# Patient Record
Sex: FEMALE | Race: BLACK OR AFRICAN AMERICAN
[De-identification: names, ages, dates, MRNs, and addresses within clinical notes are randomized per-mention and may not be internally consistent; named-entity substitution may affect disease eponyms.]

---

## 2016-02-07 VITALS
SYSTOLIC BLOOD PRESSURE: 151 MMHG | DIASTOLIC BLOOD PRESSURE: 71 MMHG | DIASTOLIC BLOOD PRESSURE: 71 MMHG | SYSTOLIC BLOOD PRESSURE: 151 MMHG | SYSTOLIC BLOOD PRESSURE: 151 MMHG | DIASTOLIC BLOOD PRESSURE: 71 MMHG | SYSTOLIC BLOOD PRESSURE: 151 MMHG | DIASTOLIC BLOOD PRESSURE: 71 MMHG | DIASTOLIC BLOOD PRESSURE: 71 MMHG | SYSTOLIC BLOOD PRESSURE: 151 MMHG

## 2018-02-16 ENCOUNTER — HOSPITAL ENCOUNTER (OUTPATIENT)
Dept: HOSPITAL 61 - MAMMO | Age: 68
Discharge: HOME | End: 2018-02-16
Attending: INTERNAL MEDICINE
Payer: COMMERCIAL

## 2018-02-16 DIAGNOSIS — Z12.31: Primary | ICD-10-CM

## 2018-02-16 PROCEDURE — 77063 BREAST TOMOSYNTHESIS BI: CPT

## 2018-02-16 PROCEDURE — 77067 SCR MAMMO BI INCL CAD: CPT

## 2018-02-27 ENCOUNTER — HOSPITAL ENCOUNTER (OUTPATIENT)
Dept: HOSPITAL 61 - MAMMO | Age: 68
Discharge: HOME | End: 2018-02-27
Attending: INTERNAL MEDICINE
Payer: COMMERCIAL

## 2018-02-27 DIAGNOSIS — N63.10: Primary | ICD-10-CM

## 2018-02-27 PROCEDURE — 76641 ULTRASOUND BREAST COMPLETE: CPT

## 2018-02-27 PROCEDURE — 77065 DX MAMMO INCL CAD UNI: CPT

## 2018-08-24 ENCOUNTER — HOSPITAL ENCOUNTER (OUTPATIENT)
Dept: HOSPITAL 61 - US | Age: 68
Discharge: HOME | End: 2018-08-24
Attending: INTERNAL MEDICINE
Payer: COMMERCIAL

## 2018-08-24 DIAGNOSIS — R92.8: Primary | ICD-10-CM

## 2018-08-24 PROCEDURE — 76641 ULTRASOUND BREAST COMPLETE: CPT

## 2018-08-24 NOTE — RAD
Right breast ultrasound, 8/24/2018:



History: Follow-up nodule



Comparison is made to a study from 2/27/2018.  The area of concern identified

on the previous study at the 6:00 retroareolar level was targeted.  Again

noted is an elongated hypoechoic nodule measuring 6 x 6 x 3 mm.  It is of

similar size and configuration compared to the previous study.  Its margins

are smooth and it is wider than tall.  It has a benign appearance.



IMPRESSION: Stable small right breast nodule.  Sonographic follow-up in 6

months at the time of the patient's bilateral mammography is suggested.





BI-RADS 3-probably benign findings

## 2020-09-15 ENCOUNTER — HOSPITAL ENCOUNTER (OUTPATIENT)
Dept: HOSPITAL 61 - MAMMO | Age: 70
Discharge: HOME | End: 2020-09-15
Attending: INTERNAL MEDICINE
Payer: COMMERCIAL

## 2020-09-15 DIAGNOSIS — Z12.31: Primary | ICD-10-CM

## 2020-09-15 PROCEDURE — 77063 BREAST TOMOSYNTHESIS BI: CPT

## 2020-09-15 PROCEDURE — 77067 SCR MAMMO BI INCL CAD: CPT

## 2020-09-16 NOTE — RAD
DATE: 9/15/2020 8:41 AM



EXAM: MAMMO NICOLAS SCREENING BILATERAL



HISTORY:  Screening



COMPARISON: 2/16/2018



Bilateral CC and MLO views of the breasts were performed. Bilateral breast

tomosynthesis was performed in CC and MLO projections.



This study was interpreted with the benefit of Computerized Aided Detection

(CAD).





FINDINGS:



Breast Density: SCATTERED  The breast parenchyma shows scattered

fibroglandular densities. Breast parenchyma level B





No suspicious masses, microcalcifications or architectural distortion is

present to suggest malignancy in either breast.





The visualized axillae are unremarkable. 



IMPRESSION: No mammographic evidence of malignancy. 



BI-RADS CATEGORY: 1 NEGATIVE



RECOMMENDED FOLLOW-UP: 12M 12 MONTH FOLLOW-UP Annual screening mammography is

recommended, unless clinically indicated sooner based on symptoms or change in

physical exam.



PQRS compliance statement: Patient information was entered into a reminder

system with a target due date for the next mammogram.



Mammography is a sensitive method for finding small breast cancers, but it

does not detect them all and is not a substitute for careful clinical

examination.  A negative mammogram does not negate a clinically suspicious

finding and should not result in delay in biopsying a clinically suspicious

abnormality.



"Our facility is accredited by the [employer] Mammography

Program."

## 2021-09-21 ENCOUNTER — HOSPITAL ENCOUNTER (OUTPATIENT)
Dept: HOSPITAL 61 - MAMMO | Age: 71
End: 2021-09-21
Attending: INTERNAL MEDICINE
Payer: COMMERCIAL

## 2021-09-21 DIAGNOSIS — Z12.31: Primary | ICD-10-CM

## 2021-09-21 PROCEDURE — 77063 BREAST TOMOSYNTHESIS BI: CPT

## 2021-09-21 PROCEDURE — 77067 SCR MAMMO BI INCL CAD: CPT

## 2021-09-21 NOTE — RAD
MG BILAT SCREEN+NICOLAS 9/21/2021 10:30 AM



INDICATION: Asymptomatic screening mammogram.



COMPARISON: 9/15/2020, 2/16/2018



TECHNIQUE: 3D tomosynthesis was performed in CC and MLO projections. 2D views were obtained from the 
3D data. CAD was utilized as needed.



FINDINGS:



Breast density: Category B: There are scattered areas of fibroglandular density.



Right breast: There are no suspicious microcalcifications, masses or areas of architectural distortio
n.



Left breast: There is a new mass in the upper left breast measuring 0.9 cm, approximately 4.2 cm from
 the nipple, along the posterior nipple line on the left CC view. Further evaluation with targeted ul
trasound is recommended.







IMPRESSION:





1. Incomplete left mammogram. Additional imaging is recommended.

2. Negative right mammogram.



BI-RADS category: 0; Incomplete



Recommendations: Recommend additional imaging for which the patient will need to be called back.



Electronically signed by: Joselyn Yeung MD (9/21/2021 2:56 PM) [id number]

## 2021-09-22 ENCOUNTER — HOSPITAL ENCOUNTER (OUTPATIENT)
Dept: HOSPITAL 61 - US | Age: 71
End: 2021-09-22
Attending: INTERNAL MEDICINE
Payer: COMMERCIAL

## 2021-09-22 DIAGNOSIS — N63.42: Primary | ICD-10-CM

## 2021-09-22 DIAGNOSIS — R92.8: ICD-10-CM

## 2021-09-22 PROCEDURE — 76641 ULTRASOUND BREAST COMPLETE: CPT

## 2021-09-22 NOTE — RAD
US BREAST LT 9/22/2021 12:54 PM



INDICATION: Left breast mass



COMPARISON: Mammogram 9/21/2021



TECHNIQUE: Targeted sonographic evaluation of the left breast and left axilla was performed.



FINDINGS:



At the 12:00 position, 6 cm from the nipple there is a 0.7 x 0.6 x 0.9 cm irregular, hypoechoic mass 
with posterior acoustic shadowing suspicious for primary breast malignancy. Further evaluation with u
ltrasound-guided core needle biopsy is recommended.



At the 12:30 position, 6 cm from the nipple, there are 2 adjacent hypoechoic circumscribed masses wit
h through transmission suggestive of cysts.



At the 12:00 position, 4 cm from the nipple there is a hypoechoic mass which appears irregular withou
t definite posterior acoustic characteristics. There may be a septation centrally. This mass measures
 approximately 0.5 x 1.1 x 0.9 cm. This mass is suspicious and warrant ultrasound-guided core needle 
biopsy.



In the left axilla there are at least 3 hypoechoic reniform lymph nodes with increased cortical thick
ness suspicious for camron disease. Cortical thickness measures up to 11 mm. Ultrasound-guided core ne
edle biopsy of left axillary lymph node is recommended.



Findings were discussed with the patient at the time of image acquisition. Results were discussed wit
génesis Savage, at 2:30 PM on 9/22/2021. An order for ultrasound-guided core needle biopsy of the left
 breast will be provided to the patient.



IMPRESSION:



Suspicious findings of the left breast which warrant ultrasound-guided core needle biopsy of at least
 2 sites within the left breast and left axillary lymph node.



BI-RADS category: 4; Suspicious



Recommendations: Tissue sampling is recommended as detailed above.



Electronically signed by: Joselyn Yeung MD (9/22/2021 2:36 PM) [id number]

## 2021-10-11 ENCOUNTER — HOSPITAL ENCOUNTER (OUTPATIENT)
Dept: HOSPITAL 61 - US | Age: 71
Discharge: HOME | End: 2021-10-11
Attending: INTERNAL MEDICINE
Payer: COMMERCIAL

## 2021-10-11 DIAGNOSIS — C77.3: ICD-10-CM

## 2021-10-11 DIAGNOSIS — C50.912: ICD-10-CM

## 2021-10-11 DIAGNOSIS — R92.8: Primary | ICD-10-CM

## 2021-10-11 DIAGNOSIS — R59.0: ICD-10-CM

## 2021-10-11 PROCEDURE — 19084 BX BREAST ADD LESION US IMAG: CPT

## 2021-10-11 PROCEDURE — 77065 DX MAMMO INCL CAD UNI: CPT

## 2021-10-11 PROCEDURE — 19083 BX BREAST 1ST LESION US IMAG: CPT

## 2021-10-11 PROCEDURE — 88305 TISSUE EXAM BY PATHOLOGIST: CPT

## 2021-10-11 PROCEDURE — 38505 NEEDLE BIOPSY LYMPH NODES: CPT

## 2021-10-11 NOTE — RAD
EXAM: 

1. ULTRASOUND-GUIDED CORE BIOPSY OF 2 LEFT BREAST MASSES WITH CLIP PLACEMENT.

2. ULTRASOUND-GUIDED CORE BIOPSY OF ENLARGED LEFT AXILLARY LYMPH NODES.

3. POSTCLIP DIAGNOSTIC LEFT MAMMOGRAPHY.



HISTORY: Left breast mass. Ultrasound-guided biopsy is requested.



FINDINGS:  The procedure along with its risks and benefits were explained to the patient. She agreed 
to proceed. A timeout procedure was performed.



Sonographic evaluation of the left breast redemonstrates the lesions of concern. These are seen as a 
hypoechoic masses at the left 12:00 position 6 cm from the nipple and 4 cm from the nipple. The overl
ayla skin was sterilely prepped and infiltrated with 1% lidocaine for local anesthesia. The lesion 6 
cm from the nipple was initially targeted. Under ultrasound guidance, 2 core needle specimens of the 
target lesion were obtained using a 14-gauge biopsy device. These were submitted in formalin. A postb
iopsy clip was placed under ultrasound guidance.



The procedure was repeated at the lesion 4 cm from the nipple. The same skin site. 3 core needle spec
imens were obtained with a 14-gauge biopsy device. These were submitted in formalin. A postbiopsy cli
p was placed under ultrasound guidance.



The largest left axillary lymph node was visualized sonographically. The overlying skin was sterilely
 prepped and infiltrated with 1% lidocaine for local anesthesia. 3 18-gauge core needle specimens wer
e obtained. A postbiopsy clip was placed under ultrasound guidance. Pressure was held to hemostasis. 
There were no immediate complications.



Full-field digital mammographic views of the left breast were obtained in CC and MLO projections and 
interpreted on a dedicated workstation. They demonstrate the postbiopsy clips in correspondence with 
the target lesions. An old postbiopsy clip is noted medially.



IMPRESSION:

1. Successful ultrasound-guided biopsy of 2 left breast masses at the 12:00 position 6 cm from the ni
pple and 4 cm from the nipple with clip placement.

2. Successful ultrasound-guided biopsy of the largest left axillary lymph node with clip placement.

3.The postbiopsy clips correspond with the target lesions.



Electronically signed by: MARTELL Martin MD (10/11/2021 1:19 PM) [id number]

## 2021-10-11 NOTE — RAD
EXAM: 

1. ULTRASOUND-GUIDED CORE BIOPSY OF 2 LEFT BREAST MASSES WITH CLIP PLACEMENT.

2. ULTRASOUND-GUIDED CORE BIOPSY OF ENLARGED LEFT AXILLARY LYMPH NODES.

3. POSTCLIP DIAGNOSTIC LEFT MAMMOGRAPHY.



HISTORY: Left breast mass. Ultrasound-guided biopsy is requested.



FINDINGS:  The procedure along with its risks and benefits were explained to the patient. She agreed 
to proceed. A timeout procedure was performed.



Sonographic evaluation of the left breast redemonstrates the lesions of concern. These are seen as a 
hypoechoic masses at the left 12:00 position 6 cm from the nipple and 4 cm from the nipple. The overl
ayla skin was sterilely prepped and infiltrated with 1% lidocaine for local anesthesia. The lesion 6 
cm from the nipple was initially targeted. Under ultrasound guidance, 2 core needle specimens of the 
target lesion were obtained using a 14-gauge biopsy device. These were submitted in formalin. A postb
iopsy clip was placed under ultrasound guidance.



The procedure was repeated at the lesion 4 cm from the nipple. The same skin site. 3 core needle spec
imens were obtained with a 14-gauge biopsy device. These were submitted in formalin. A postbiopsy cli
p was placed under ultrasound guidance.



The largest left axillary lymph node was visualized sonographically. The overlying skin was sterilely
 prepped and infiltrated with 1% lidocaine for local anesthesia. 3 18-gauge core needle specimens wer
e obtained. A postbiopsy clip was placed under ultrasound guidance. Pressure was held to hemostasis. 
There were no immediate complications.



Full-field digital mammographic views of the left breast were obtained in CC and MLO projections and 
interpreted on a dedicated workstation. They demonstrate the postbiopsy clips in correspondence with 
the target lesions. An old postbiopsy clip is noted medially.



IMPRESSION:

1. Successful ultrasound-guided biopsy of 2 left breast masses at the 12:00 position 6 cm from the ni
pple and 4 cm from the nipple with clip placement.

2. Successful ultrasound-guided biopsy of the largest left axillary lymph node with clip placement.

3.The postbiopsy clips correspond with the target lesions.



Electronically signed by: MARTELL Martin MD (10/11/2021 1:19 PM) [id number]

## 2021-10-11 NOTE — RAD
EXAM: 

1. ULTRASOUND-GUIDED CORE BIOPSY OF 2 LEFT BREAST MASSES WITH CLIP PLACEMENT.

2. ULTRASOUND-GUIDED CORE BIOPSY OF ENLARGED LEFT AXILLARY LYMPH NODES.

3. POSTCLIP DIAGNOSTIC LEFT MAMMOGRAPHY.



HISTORY: Left breast mass. Ultrasound-guided biopsy is requested.



FINDINGS:  The procedure along with its risks and benefits were explained to the patient. She agreed 
to proceed. A timeout procedure was performed.



Sonographic evaluation of the left breast redemonstrates the lesions of concern. These are seen as a 
hypoechoic masses at the left 12:00 position 6 cm from the nipple and 4 cm from the nipple. The overl
ayla skin was sterilely prepped and infiltrated with 1% lidocaine for local anesthesia. The lesion 6 
cm from the nipple was initially targeted. Under ultrasound guidance, 2 core needle specimens of the 
target lesion were obtained using a 14-gauge biopsy device. These were submitted in formalin. A postb
iopsy clip was placed under ultrasound guidance.



The procedure was repeated at the lesion 4 cm from the nipple. The same skin site. 3 core needle spec
imens were obtained with a 14-gauge biopsy device. These were submitted in formalin. A postbiopsy cli
p was placed under ultrasound guidance.



The largest left axillary lymph node was visualized sonographically. The overlying skin was sterilely
 prepped and infiltrated with 1% lidocaine for local anesthesia. 3 18-gauge core needle specimens wer
e obtained. A postbiopsy clip was placed under ultrasound guidance. Pressure was held to hemostasis. 
There were no immediate complications.



Full-field digital mammographic views of the left breast were obtained in CC and MLO projections and 
interpreted on a dedicated workstation. They demonstrate the postbiopsy clips in correspondence with 
the target lesions. An old postbiopsy clip is noted medially.



IMPRESSION:

1. Successful ultrasound-guided biopsy of 2 left breast masses at the 12:00 position 6 cm from the ni
pple and 4 cm from the nipple with clip placement.

2. Successful ultrasound-guided biopsy of the largest left axillary lymph node with clip placement.

3.The postbiopsy clips correspond with the target lesions.



Electronically signed by: MARTELL Martin MD (10/11/2021 12:57 PM) [id number]

## 2021-10-11 NOTE — RAD
EXAM: 

1. ULTRASOUND-GUIDED CORE BIOPSY OF 2 LEFT BREAST MASSES WITH CLIP PLACEMENT.

2. ULTRASOUND-GUIDED CORE BIOPSY OF ENLARGED LEFT AXILLARY LYMPH NODES.

3. POSTCLIP DIAGNOSTIC LEFT MAMMOGRAPHY.



HISTORY: Left breast mass. Ultrasound-guided biopsy is requested.



FINDINGS:  The procedure along with its risks and benefits were explained to the patient. She agreed 
to proceed. A timeout procedure was performed.



Sonographic evaluation of the left breast redemonstrates the lesions of concern. These are seen as a 
hypoechoic masses at the left 12:00 position 6 cm from the nipple and 4 cm from the nipple. The overl
ayla skin was sterilely prepped and infiltrated with 1% lidocaine for local anesthesia. The lesion 6 
cm from the nipple was initially targeted. Under ultrasound guidance, 2 core needle specimens of the 
target lesion were obtained using a 14-gauge biopsy device. These were submitted in formalin. A postb
iopsy clip was placed under ultrasound guidance.



The procedure was repeated at the lesion 4 cm from the nipple. The same skin site. 3 core needle spec
imens were obtained with a 14-gauge biopsy device. These were submitted in formalin. A postbiopsy cli
p was placed under ultrasound guidance.



The largest left axillary lymph node was visualized sonographically. The overlying skin was sterilely
 prepped and infiltrated with 1% lidocaine for local anesthesia. 3 18-gauge core needle specimens wer
e obtained. A postbiopsy clip was placed under ultrasound guidance. Pressure was held to hemostasis. 
There were no immediate complications.



Full-field digital mammographic views of the left breast were obtained in CC and MLO projections and 
interpreted on a dedicated workstation. They demonstrate the postbiopsy clips in correspondence with 
the target lesions. An old postbiopsy clip is noted medially.



IMPRESSION:

1. Successful ultrasound-guided biopsy of 2 left breast masses at the 12:00 position 6 cm from the ni
pple and 4 cm from the nipple with clip placement.

2. Successful ultrasound-guided biopsy of the largest left axillary lymph node with clip placement.

3.The postbiopsy clips correspond with the target lesions.



Electronically signed by: MARTELL Martin MD (10/11/2021 1:19 PM) [id number]

## 2021-10-13 NOTE — PATHOLOGY
[facility] [place] Accession Number: [id number]

.                                                                01

Material submitted:                                        .

PART A: breast - LEFT BREAST MASS 12:00 6 CMFN. Modifiers: left

PART B: breast - LEFT BREAST MASS 12:00 4CMFN. Modifiers: left

PART C: axillary tail of breast - LEFT AXILLARY LYMPH NODE . Modifiers:

left

.                                                                02

**********************************************************************

Diagnosis:

A.  Breast tissue, left breast mass 12:00 6 cm from nipple needle

biopsies:

- INVASIVE DUCTAL CARCINOMA, HISTOLOGIC GRADE 3.

.

B.  Breast tissue, left breast mass 12:00 4 cm from nipple needle

biopsies:

- INVASIVE DUCTAL CARCINOMA, HISTOLOGIC GRADE 3.

.

C.  Lymph node, left axillary lymph node needle biopsies:

- METASTATIC CARCINOMA.

.

(JPM:kavon; 10/12/2021)

[facility]  10/12/2021  1206 Local

**********************************************************************

.                                                                02

Comment:

Sections of the left breast mass biopsies at 12:00 6 cm from nipple and 4

cm from nipple appear similar and reveal an invasive mammary carcinoma.

The tumor cells are present in nests and cords and show little tubule

formation.  The tumor is associated with a reactive and chronically

inflamed stroma.  The tumor cells show marked nuclear pleomorphism.

Mitotic figures are readily demonstrated.  The invasive carcinoma 6 cm

from nipple measures approximately 7 mm in greatest dimension on the glass

slide.  The invasive carcinoma 4 cm from nipple measures approximately 1.0

cm in greatest dimension on the glass slide.  There is no lymphovascular

tumor invasion.  There are no tumor associated calcifications. The

morphologic findings are supportive of the diagnosis of invasive ductal

carcinoma, histologic grade 3. Sections of the axillary node biopsy show

metastatic carcinoma morphologically consistent with breast origin. Breast

prognostic studies will be obtained on B2, the results of which will be

reported separately.  The case is also examined by Dr. Peña, who

concurs with the diagnoses. (JPM:; 10/12/2021)

.                                                                02

Electronically signed:                                     .

Troy Guerra MD, Pathologist

NPI- [id number]

.                                                                01

Gross description:                                         .

A.  The specimen is received in formalin, labeled "Inés Mckay, LT

breast 12:00 6 cmFN" and consists of 2 fibrofatty cylindrical tissues

(ranging in length from 1.5 cm to 1.7 cm and each averaging 0.2 cm in

diameter).  The specimen is submitted in toto in A1-A2.  The cold ischemic

time is 2 minutes.  The total time in formalin is 10 hours.

.

B.  The specimen is received in formalin, labeled "Mckay, Verdis, Lt

breast 1200 4 cmFN" and consists of 3 fibrofatty cylindrical tissues

ranging in length from 1.5 cm to 1.8 cm and each averaging 0.2 cm in

diameter.  The specimen is submitted in toto in B1-B3.  The cold ischemic

time is 60 seconds.  The total time in formalin is 10 hours.

.

C.  The specimen is received in formalin, labeled "Mckay, Verdis, LT

axillary node" and consists of multiple gray-tan, fragmented cylindrical

tissues (ranging in length from 0.4 cm to 1.2 cm and each averaging 0.1 cm

in diameter).  The specimen is submitted entirely in C1-C3. ([place];

10/11/2021)

DKA/DKA  10/12/2021  1200 Local

.                                                                02

Pathologist provided ICD-10:

C50.912, C77.3

.                                                                02

CPT                                                        .

412756, [id number], [id number]

Specimen Comment: A courtesy copy of this report has been sent to [phone], [phone]

Specimen Comment: [id number]

Specimen Comment: Report sent to  / DR AVILEZ

Specimen Comment: A duplicate report has been generated due to demographic updates.

***Performed at:  01

   [facility]

   [address], [place]  [id number]

   MD Tc Hernandez MD Phone:  [phone]

***Performed at:  02

   [facility]

   [address] [place]  [id number]

   MD Troy Guerra MD Phone:  [phone]